# Patient Record
Sex: MALE | Race: WHITE | Employment: FULL TIME | URBAN - METROPOLITAN AREA
[De-identification: names, ages, dates, MRNs, and addresses within clinical notes are randomized per-mention and may not be internally consistent; named-entity substitution may affect disease eponyms.]

---

## 2018-09-16 ENCOUNTER — HOSPITAL ENCOUNTER (OUTPATIENT)
Age: 33
Discharge: HOME | End: 2018-09-16

## 2018-09-16 ENCOUNTER — ANESTHESIA EVENT (OUTPATIENT)
Dept: OPERATING ROOM | Age: 33
DRG: 343 | End: 2018-09-16

## 2018-09-16 ENCOUNTER — APPOINTMENT (OUTPATIENT)
Dept: CT IMAGING | Age: 33
DRG: 343 | End: 2018-09-16

## 2018-09-16 ENCOUNTER — ANESTHESIA (OUTPATIENT)
Dept: OPERATING ROOM | Age: 33
DRG: 343 | End: 2018-09-16

## 2018-09-16 ENCOUNTER — HOSPITAL ENCOUNTER (INPATIENT)
Age: 33
LOS: 1 days | Discharge: HOME | DRG: 343 | End: 2018-09-17
Attending: EMERGENCY MEDICINE | Admitting: SURGERY

## 2018-09-16 VITALS
TEMPERATURE: 99 F | SYSTOLIC BLOOD PRESSURE: 165 MMHG | DIASTOLIC BLOOD PRESSURE: 96 MMHG | OXYGEN SATURATION: 100 % | RESPIRATION RATE: 13 BRPM

## 2018-09-16 DIAGNOSIS — K35.30 ACUTE APPENDICITIS WITH LOCALIZED PERITONITIS: Primary | ICD-10-CM

## 2018-09-16 PROBLEM — K37 APPENDICITIS: Status: ACTIVE | Noted: 2018-09-16

## 2018-09-16 LAB
ALBUMIN SERPL-MCNC: 4.8 G/DL (ref 3.5–5.2)
ALP BLD-CCNC: 80 U/L (ref 40–129)
ALT SERPL-CCNC: 16 U/L (ref 0–40)
ANION GAP SERPL CALCULATED.3IONS-SCNC: 15 MMOL/L (ref 7–16)
AST SERPL-CCNC: 19 U/L (ref 0–39)
BACTERIA: NORMAL /HPF
BASOPHILS ABSOLUTE: 0.08 E9/L (ref 0–0.2)
BASOPHILS RELATIVE PERCENT: 0.3 % (ref 0–2)
BILIRUB SERPL-MCNC: 0.8 MG/DL (ref 0–1.2)
BILIRUBIN URINE: ABNORMAL
BLOOD, URINE: ABNORMAL
BUN BLDV-MCNC: 12 MG/DL (ref 6–20)
CALCIUM SERPL-MCNC: 10 MG/DL (ref 8.6–10.2)
CHLORIDE BLD-SCNC: 99 MMOL/L (ref 98–107)
CLARITY: CLEAR
CO2: 27 MMOL/L (ref 22–29)
COLOR: YELLOW
CREAT SERPL-MCNC: 0.9 MG/DL (ref 0.7–1.2)
EOSINOPHILS ABSOLUTE: 0.02 E9/L (ref 0.05–0.5)
EOSINOPHILS RELATIVE PERCENT: 0.1 % (ref 0–6)
GFR AFRICAN AMERICAN: >60
GFR NON-AFRICAN AMERICAN: >60 ML/MIN/1.73
GLUCOSE BLD-MCNC: 132 MG/DL (ref 74–109)
GLUCOSE URINE: NEGATIVE MG/DL
HCT VFR BLD CALC: 46.5 % (ref 37–54)
HEMOGLOBIN: 15.7 G/DL (ref 12.5–16.5)
IMMATURE GRANULOCYTES #: 0.27 E9/L
IMMATURE GRANULOCYTES %: 0.9 % (ref 0–5)
KETONES, URINE: NEGATIVE MG/DL
LACTIC ACID: 2.1 MMOL/L (ref 0.5–2.2)
LEUKOCYTE ESTERASE, URINE: NEGATIVE
LIPASE: 18 U/L (ref 13–60)
LYMPHOCYTES ABSOLUTE: 2.14 E9/L (ref 1.5–4)
LYMPHOCYTES RELATIVE PERCENT: 7.4 % (ref 20–42)
MCH RBC QN AUTO: 28.8 PG (ref 26–35)
MCHC RBC AUTO-ENTMCNC: 33.8 % (ref 32–34.5)
MCV RBC AUTO: 85.2 FL (ref 80–99.9)
MONOCYTES ABSOLUTE: 3.59 E9/L (ref 0.1–0.95)
MONOCYTES RELATIVE PERCENT: 12.5 % (ref 2–12)
NEUTROPHILS ABSOLUTE: 22.73 E9/L (ref 1.8–7.3)
NEUTROPHILS RELATIVE PERCENT: 78.8 % (ref 43–80)
NITRITE, URINE: NEGATIVE
PDW BLD-RTO: 13.6 FL (ref 11.5–15)
PH UA: 8 (ref 5–9)
PLATELET # BLD: 295 E9/L (ref 130–450)
PMV BLD AUTO: 9.2 FL (ref 7–12)
POTASSIUM SERPL-SCNC: 4.2 MMOL/L (ref 3.5–5)
PROTEIN UA: 30 MG/DL
RBC # BLD: 5.46 E12/L (ref 3.8–5.8)
RBC UA: NORMAL /HPF (ref 0–2)
SODIUM BLD-SCNC: 141 MMOL/L (ref 132–146)
SPECIFIC GRAVITY UA: 1.01 (ref 1–1.03)
TOTAL PROTEIN: 8.3 G/DL (ref 6.4–8.3)
UROBILINOGEN, URINE: 1 E.U./DL
WBC # BLD: 28.8 E9/L (ref 4.5–11.5)
WBC UA: NORMAL /HPF (ref 0–5)

## 2018-09-16 PROCEDURE — 3700000000 HC ANESTHESIA ATTENDED CARE: Performed by: SURGERY

## 2018-09-16 PROCEDURE — 99223 1ST HOSP IP/OBS HIGH 75: CPT | Performed by: SURGERY

## 2018-09-16 PROCEDURE — 83605 ASSAY OF LACTIC ACID: CPT

## 2018-09-16 PROCEDURE — 2500000003 HC RX 250 WO HCPCS: Performed by: NURSE ANESTHETIST, CERTIFIED REGISTERED

## 2018-09-16 PROCEDURE — 7100000001 HC PACU RECOVERY - ADDTL 15 MIN: Performed by: SURGERY

## 2018-09-16 PROCEDURE — 81001 URINALYSIS AUTO W/SCOPE: CPT

## 2018-09-16 PROCEDURE — 2500000003 HC RX 250 WO HCPCS: Performed by: SURGERY

## 2018-09-16 PROCEDURE — 83690 ASSAY OF LIPASE: CPT

## 2018-09-16 PROCEDURE — 96374 THER/PROPH/DIAG INJ IV PUSH: CPT

## 2018-09-16 PROCEDURE — 6360000004 HC RX CONTRAST MEDICATION: Performed by: RADIOLOGY

## 2018-09-16 PROCEDURE — 2580000003 HC RX 258: Performed by: EMERGENCY MEDICINE

## 2018-09-16 PROCEDURE — 99285 EMERGENCY DEPT VISIT HI MDM: CPT

## 2018-09-16 PROCEDURE — 88304 TISSUE EXAM BY PATHOLOGIST: CPT

## 2018-09-16 PROCEDURE — 6360000002 HC RX W HCPCS: Performed by: EMERGENCY MEDICINE

## 2018-09-16 PROCEDURE — 2500000003 HC RX 250 WO HCPCS: Performed by: EMERGENCY MEDICINE

## 2018-09-16 PROCEDURE — 74177 CT ABD & PELVIS W/CONTRAST: CPT

## 2018-09-16 PROCEDURE — 3700000001 HC ADD 15 MINUTES (ANESTHESIA): Performed by: SURGERY

## 2018-09-16 PROCEDURE — 6360000002 HC RX W HCPCS: Performed by: NURSE ANESTHETIST, CERTIFIED REGISTERED

## 2018-09-16 PROCEDURE — A0428 BLS: HCPCS

## 2018-09-16 PROCEDURE — 96375 TX/PRO/DX INJ NEW DRUG ADDON: CPT

## 2018-09-16 PROCEDURE — 44970 LAPAROSCOPY APPENDECTOMY: CPT | Performed by: SURGERY

## 2018-09-16 PROCEDURE — 85025 COMPLETE CBC W/AUTO DIFF WBC: CPT

## 2018-09-16 PROCEDURE — 80053 COMPREHEN METABOLIC PANEL: CPT

## 2018-09-16 PROCEDURE — 1200000000 HC SEMI PRIVATE

## 2018-09-16 PROCEDURE — 36415 COLL VENOUS BLD VENIPUNCTURE: CPT

## 2018-09-16 PROCEDURE — 2709999900 HC NON-CHARGEABLE SUPPLY: Performed by: SURGERY

## 2018-09-16 PROCEDURE — 7100000000 HC PACU RECOVERY - FIRST 15 MIN: Performed by: SURGERY

## 2018-09-16 PROCEDURE — 0DTJ4ZZ RESECTION OF APPENDIX, PERCUTANEOUS ENDOSCOPIC APPROACH: ICD-10-PCS | Performed by: SURGERY

## 2018-09-16 PROCEDURE — A0425 GROUND MILEAGE: HCPCS

## 2018-09-16 PROCEDURE — 3600000012 HC SURGERY LEVEL 2 ADDTL 15MIN: Performed by: SURGERY

## 2018-09-16 PROCEDURE — 2720000010 HC SURG SUPPLY STERILE: Performed by: SURGERY

## 2018-09-16 PROCEDURE — 3600000002 HC SURGERY LEVEL 2 BASE: Performed by: SURGERY

## 2018-09-16 RX ORDER — MIDAZOLAM HYDROCHLORIDE 1 MG/ML
INJECTION INTRAMUSCULAR; INTRAVENOUS PRN
Status: DISCONTINUED | OUTPATIENT
Start: 2018-09-16 | End: 2018-09-16 | Stop reason: SDUPTHER

## 2018-09-16 RX ORDER — SODIUM CHLORIDE 0.9 % (FLUSH) 0.9 %
10 SYRINGE (ML) INJECTION PRN
Status: DISCONTINUED | OUTPATIENT
Start: 2018-09-16 | End: 2018-09-17 | Stop reason: HOSPADM

## 2018-09-16 RX ORDER — ACETAMINOPHEN 325 MG/1
650 TABLET ORAL EVERY 4 HOURS PRN
Status: DISCONTINUED | OUTPATIENT
Start: 2018-09-16 | End: 2018-09-17 | Stop reason: HOSPADM

## 2018-09-16 RX ORDER — OXYCODONE HYDROCHLORIDE AND ACETAMINOPHEN 5; 325 MG/1; MG/1
1 TABLET ORAL EVERY 4 HOURS PRN
Status: DISCONTINUED | OUTPATIENT
Start: 2018-09-16 | End: 2018-09-17 | Stop reason: HOSPADM

## 2018-09-16 RX ORDER — ONDANSETRON 2 MG/ML
INJECTION INTRAMUSCULAR; INTRAVENOUS PRN
Status: DISCONTINUED | OUTPATIENT
Start: 2018-09-16 | End: 2018-09-16 | Stop reason: SDUPTHER

## 2018-09-16 RX ORDER — ROCURONIUM BROMIDE 10 MG/ML
INJECTION, SOLUTION INTRAVENOUS PRN
Status: DISCONTINUED | OUTPATIENT
Start: 2018-09-16 | End: 2018-09-16 | Stop reason: SDUPTHER

## 2018-09-16 RX ORDER — KETOROLAC TROMETHAMINE 30 MG/ML
30 INJECTION, SOLUTION INTRAMUSCULAR; INTRAVENOUS ONCE
Status: COMPLETED | OUTPATIENT
Start: 2018-09-16 | End: 2018-09-16

## 2018-09-16 RX ORDER — 0.9 % SODIUM CHLORIDE 0.9 %
1000 INTRAVENOUS SOLUTION INTRAVENOUS ONCE
Status: COMPLETED | OUTPATIENT
Start: 2018-09-16 | End: 2018-09-16

## 2018-09-16 RX ORDER — LIDOCAINE HYDROCHLORIDE 20 MG/ML
INJECTION, SOLUTION INFILTRATION; PERINEURAL PRN
Status: DISCONTINUED | OUTPATIENT
Start: 2018-09-16 | End: 2018-09-16 | Stop reason: SDUPTHER

## 2018-09-16 RX ORDER — SODIUM CHLORIDE, SODIUM LACTATE, POTASSIUM CHLORIDE, CALCIUM CHLORIDE 600; 310; 30; 20 MG/100ML; MG/100ML; MG/100ML; MG/100ML
INJECTION, SOLUTION INTRAVENOUS CONTINUOUS
Status: DISCONTINUED | OUTPATIENT
Start: 2018-09-16 | End: 2018-09-17 | Stop reason: HOSPADM

## 2018-09-16 RX ORDER — ONDANSETRON 2 MG/ML
8 INJECTION INTRAMUSCULAR; INTRAVENOUS ONCE
Status: COMPLETED | OUTPATIENT
Start: 2018-09-16 | End: 2018-09-16

## 2018-09-16 RX ORDER — NEOSTIGMINE METHYLSULFATE 1 MG/ML
INJECTION, SOLUTION INTRAVENOUS PRN
Status: DISCONTINUED | OUTPATIENT
Start: 2018-09-16 | End: 2018-09-16 | Stop reason: SDUPTHER

## 2018-09-16 RX ORDER — BUPIVACAINE HYDROCHLORIDE AND EPINEPHRINE 2.5; 5 MG/ML; UG/ML
INJECTION, SOLUTION EPIDURAL; INFILTRATION; INTRACAUDAL; PERINEURAL PRN
Status: DISCONTINUED | OUTPATIENT
Start: 2018-09-16 | End: 2018-09-17 | Stop reason: HOSPADM

## 2018-09-16 RX ORDER — SUCCINYLCHOLINE/SOD CL,ISO/PF 100 MG/5ML
SYRINGE (ML) INTRAVENOUS PRN
Status: DISCONTINUED | OUTPATIENT
Start: 2018-09-16 | End: 2018-09-16 | Stop reason: SDUPTHER

## 2018-09-16 RX ORDER — FENTANYL CITRATE 50 UG/ML
INJECTION, SOLUTION INTRAMUSCULAR; INTRAVENOUS PRN
Status: DISCONTINUED | OUTPATIENT
Start: 2018-09-16 | End: 2018-09-16 | Stop reason: SDUPTHER

## 2018-09-16 RX ORDER — GLYCOPYRROLATE 1 MG/5 ML
SYRINGE (ML) INTRAVENOUS PRN
Status: DISCONTINUED | OUTPATIENT
Start: 2018-09-16 | End: 2018-09-16 | Stop reason: SDUPTHER

## 2018-09-16 RX ORDER — OXYCODONE HYDROCHLORIDE AND ACETAMINOPHEN 5; 325 MG/1; MG/1
2 TABLET ORAL EVERY 4 HOURS PRN
Status: DISCONTINUED | OUTPATIENT
Start: 2018-09-16 | End: 2018-09-17 | Stop reason: HOSPADM

## 2018-09-16 RX ORDER — PROPOFOL 10 MG/ML
INJECTION, EMULSION INTRAVENOUS PRN
Status: DISCONTINUED | OUTPATIENT
Start: 2018-09-16 | End: 2018-09-16 | Stop reason: SDUPTHER

## 2018-09-16 RX ORDER — OXYCODONE HYDROCHLORIDE AND ACETAMINOPHEN 5; 325 MG/1; MG/1
1 TABLET ORAL EVERY 4 HOURS PRN
Qty: 15 TABLET | Refills: 0 | Status: SHIPPED | OUTPATIENT
Start: 2018-09-16 | End: 2018-09-19

## 2018-09-16 RX ORDER — SODIUM CHLORIDE 9 MG/ML
INJECTION, SOLUTION INTRAVENOUS CONTINUOUS
Status: DISCONTINUED | OUTPATIENT
Start: 2018-09-16 | End: 2018-09-17 | Stop reason: HOSPADM

## 2018-09-16 RX ORDER — DEXAMETHASONE SODIUM PHOSPHATE 10 MG/ML
INJECTION, SOLUTION INTRAMUSCULAR; INTRAVENOUS PRN
Status: DISCONTINUED | OUTPATIENT
Start: 2018-09-16 | End: 2018-09-16 | Stop reason: SDUPTHER

## 2018-09-16 RX ORDER — SODIUM CHLORIDE 0.9 % (FLUSH) 0.9 %
10 SYRINGE (ML) INJECTION EVERY 12 HOURS SCHEDULED
Status: DISCONTINUED | OUTPATIENT
Start: 2018-09-16 | End: 2018-09-17 | Stop reason: HOSPADM

## 2018-09-16 RX ORDER — MORPHINE SULFATE 10 MG/ML
6 INJECTION, SOLUTION INTRAMUSCULAR; INTRAVENOUS ONCE
Status: COMPLETED | OUTPATIENT
Start: 2018-09-16 | End: 2018-09-16

## 2018-09-16 RX ADMIN — ONDANSETRON HYDROCHLORIDE 4 MG: 2 INJECTION, SOLUTION INTRAMUSCULAR; INTRAVENOUS at 22:35

## 2018-09-16 RX ADMIN — KETOROLAC TROMETHAMINE 30 MG: 30 INJECTION, SOLUTION INTRAMUSCULAR at 16:33

## 2018-09-16 RX ADMIN — Medication 0.6 MG: at 22:56

## 2018-09-16 RX ADMIN — ONDANSETRON 8 MG: 2 INJECTION INTRAMUSCULAR; INTRAVENOUS at 16:31

## 2018-09-16 RX ADMIN — METRONIDAZOLE 500 MG: 500 INJECTION, SOLUTION INTRAVENOUS at 18:31

## 2018-09-16 RX ADMIN — SODIUM CHLORIDE: 9 INJECTION, SOLUTION INTRAVENOUS at 22:44

## 2018-09-16 RX ADMIN — ROCURONIUM BROMIDE 10 MG: 10 INJECTION INTRAVENOUS at 22:08

## 2018-09-16 RX ADMIN — CEFAZOLIN SODIUM 2 G: 2 SOLUTION INTRAVENOUS at 17:58

## 2018-09-16 RX ADMIN — MORPHINE SULFATE 6 MG: 10 INJECTION INTRAVENOUS at 17:50

## 2018-09-16 RX ADMIN — ROCURONIUM BROMIDE 20 MG: 10 INJECTION INTRAVENOUS at 22:25

## 2018-09-16 RX ADMIN — PROPOFOL 200 MG: 10 INJECTION, EMULSION INTRAVENOUS at 22:08

## 2018-09-16 RX ADMIN — SODIUM CHLORIDE: 9 INJECTION, SOLUTION INTRAVENOUS at 17:54

## 2018-09-16 RX ADMIN — Medication 160 MG: at 22:08

## 2018-09-16 RX ADMIN — LIDOCAINE HYDROCHLORIDE 100 MG: 20 INJECTION, SOLUTION INFILTRATION; PERINEURAL at 22:08

## 2018-09-16 RX ADMIN — MIDAZOLAM HYDROCHLORIDE 2 MG: 1 INJECTION, SOLUTION INTRAMUSCULAR; INTRAVENOUS at 22:08

## 2018-09-16 RX ADMIN — FENTANYL CITRATE 50 MCG: 50 INJECTION, SOLUTION INTRAMUSCULAR; INTRAVENOUS at 22:46

## 2018-09-16 RX ADMIN — Medication 3 MG: at 22:56

## 2018-09-16 RX ADMIN — SODIUM CHLORIDE 1000 ML: 9 INJECTION, SOLUTION INTRAVENOUS at 16:30

## 2018-09-16 RX ADMIN — FENTANYL CITRATE 50 MCG: 50 INJECTION, SOLUTION INTRAMUSCULAR; INTRAVENOUS at 22:25

## 2018-09-16 RX ADMIN — DEXAMETHASONE SODIUM PHOSPHATE 10 MG: 10 INJECTION, SOLUTION INTRAMUSCULAR; INTRAVENOUS at 22:17

## 2018-09-16 RX ADMIN — WATER 2 G: 1 INJECTION INTRAMUSCULAR; INTRAVENOUS; SUBCUTANEOUS at 22:32

## 2018-09-16 RX ADMIN — ROCURONIUM BROMIDE 20 MG: 10 INJECTION INTRAVENOUS at 22:17

## 2018-09-16 RX ADMIN — IOPAMIDOL 100 ML: 755 INJECTION, SOLUTION INTRAVENOUS at 17:29

## 2018-09-16 ASSESSMENT — PAIN DESCRIPTION - DESCRIPTORS
DESCRIPTORS: CONSTANT
DESCRIPTORS: CONSTANT;SHARP

## 2018-09-16 ASSESSMENT — ENCOUNTER SYMPTOMS
BLOOD IN STOOL: 0
DIARRHEA: 0
CHEST TIGHTNESS: 0
CONSTIPATION: 0
ABDOMINAL PAIN: 1
SHORTNESS OF BREATH: 0
WHEEZING: 0
TROUBLE SWALLOWING: 0
NAUSEA: 1
VOMITING: 1
COUGH: 0
SORE THROAT: 0
RHINORRHEA: 0

## 2018-09-16 ASSESSMENT — PULMONARY FUNCTION TESTS
PIF_VALUE: 3
PIF_VALUE: 23
PIF_VALUE: 0
PIF_VALUE: 28
PIF_VALUE: 27
PIF_VALUE: 9
PIF_VALUE: 30
PIF_VALUE: 0
PIF_VALUE: 1
PIF_VALUE: 29
PIF_VALUE: 29
PIF_VALUE: 2
PIF_VALUE: 1
PIF_VALUE: 20
PIF_VALUE: 8
PIF_VALUE: 30
PIF_VALUE: 22
PIF_VALUE: 29
PIF_VALUE: 4
PIF_VALUE: 21
PIF_VALUE: 24
PIF_VALUE: 28
PIF_VALUE: 29
PIF_VALUE: 20
PIF_VALUE: 16
PIF_VALUE: 21
PIF_VALUE: 29
PIF_VALUE: 3
PIF_VALUE: 29
PIF_VALUE: 20
PIF_VALUE: 22
PIF_VALUE: 28
PIF_VALUE: 29
PIF_VALUE: 21
PIF_VALUE: 1
PIF_VALUE: 20
PIF_VALUE: 24
PIF_VALUE: 19
PIF_VALUE: 20
PIF_VALUE: 28
PIF_VALUE: 20
PIF_VALUE: 4
PIF_VALUE: 28
PIF_VALUE: 20
PIF_VALUE: 29
PIF_VALUE: 3
PIF_VALUE: 31
PIF_VALUE: 2
PIF_VALUE: 30
PIF_VALUE: 21
PIF_VALUE: 30
PIF_VALUE: 28
PIF_VALUE: 20
PIF_VALUE: 27
PIF_VALUE: 20
PIF_VALUE: 20
PIF_VALUE: 30
PIF_VALUE: 20
PIF_VALUE: 30
PIF_VALUE: 4
PIF_VALUE: 3
PIF_VALUE: 29

## 2018-09-16 ASSESSMENT — PAIN DESCRIPTION - ORIENTATION
ORIENTATION: RIGHT;LOWER
ORIENTATION: RIGHT
ORIENTATION: RIGHT;LOWER

## 2018-09-16 ASSESSMENT — PAIN SCALES - GENERAL
PAINLEVEL_OUTOF10: 7
PAINLEVEL_OUTOF10: 7
PAINLEVEL_OUTOF10: 0
PAINLEVEL_OUTOF10: 10
PAINLEVEL_OUTOF10: 0
PAINLEVEL_OUTOF10: 7
PAINLEVEL_OUTOF10: 10

## 2018-09-16 ASSESSMENT — PAIN DESCRIPTION - PAIN TYPE
TYPE: ACUTE PAIN

## 2018-09-16 ASSESSMENT — PAIN DESCRIPTION - LOCATION
LOCATION: ABDOMEN

## 2018-09-16 ASSESSMENT — LIFESTYLE VARIABLES: SMOKING_STATUS: 1

## 2018-09-16 NOTE — ED NOTES
Flagyl not finished running but stopped for EMS transfer and notified nursing in [place] [id number] of need to finish flagyl on arrival.     Maki Frey RN  09/16/18 3064

## 2018-09-16 NOTE — ED PROVIDER NOTES
membranes are normal. Mucous membranes are not pale and not dry. Eyes: Pupils are equal, round, and reactive to light. Conjunctivae and EOM are normal.   Neck: Normal range of motion. Neck supple. Cardiovascular: Normal rate, regular rhythm, S1 normal, S2 normal, normal heart sounds and intact distal pulses. No murmur heard. Pulses:       Radial pulses are 2+ on the right side, and 2+ on the left side. Dorsalis pedis pulses are 2+ on the right side, and 2+ on the left side. Pulmonary/Chest: Effort normal and breath sounds normal. No respiratory distress. He has no decreased breath sounds. He has no wheezes. He has no rhonchi. Abdominal: Soft. Bowel sounds are normal. He exhibits no distension. There is tenderness in the right lower quadrant. There is rebound, guarding (voluntary) and tenderness at McBurney's point. Musculoskeletal: Normal range of motion. He exhibits no edema. Neurological: He is alert and oriented to person, place, and time. Skin: Skin is warm, dry and intact. No rash noted. No erythema. Psychiatric: He has a normal mood and affect. His speech is normal and behavior is normal.   Nursing note and vitals reviewed. Procedures    [facility]         --------------------------------------------- PAST HISTORY ---------------------------------------------  Past Medical History:  has a past medical history of Heart enlarged. Past Surgical History:  has no past surgical history on file. Social History:  reports that he has been smoking Cigarettes. He has never used smokeless tobacco.    Family History: family history is not on file. The patients home medications have been reviewed. Allergies: Patient has no known allergies.     -------------------------------------------------- RESULTS -------------------------------------------------    Lab  Results for orders placed or performed during the hospital encounter of 09/16/18   CBC WITH AUTO DIFFERENTIAL   Result Value Ref Range    WBC 28.8 (H) 4.5 - 11.5 E9/L    RBC 5.46 3.80 - 5.80 E12/L    Hemoglobin 15.7 12.5 - 16.5 g/dL    Hematocrit 46.5 37.0 - 54.0 %    MCV 85.2 80.0 - 99.9 fL    MCH 28.8 26.0 - 35.0 pg    MCHC 33.8 32.0 - 34.5 %    RDW 13.6 11.5 - 15.0 fL    Platelets 592 198 - 044 E9/L    MPV 9.2 7.0 - 12.0 fL    Neutrophils % 78.8 43.0 - 80.0 %    Immature Granulocytes % 0.9 0.0 - 5.0 %    Lymphocytes % 7.4 (L) 20.0 - 42.0 %    Monocytes % 12.5 (H) 2.0 - 12.0 %    Eosinophils % 0.1 0.0 - 6.0 %    Basophils % 0.3 0.0 - 2.0 %    Neutrophils # 22.73 (H) 1.80 - 7.30 E9/L    Immature Granulocytes # 0.27 E9/L    Lymphocytes # 2.14 1.50 - 4.00 E9/L    Monocytes # 3.59 (H) 0.10 - 0.95 E9/L    Eosinophils # 0.02 (L) 0.05 - 0.50 E9/L    Basophils # 0.08 0.00 - 0.20 E9/L   Comprehensive Metabolic Panel   Result Value Ref Range    Sodium 141 132 - 146 mmol/L    Potassium 4.2 3.5 - 5.0 mmol/L    Chloride 99 98 - 107 mmol/L    CO2 27 22 - 29 mmol/L    Anion Gap 15 7 - 16 mmol/L    Glucose 132 (H) 74 - 109 mg/dL    BUN 12 6 - 20 mg/dL    CREATININE 0.9 0.7 - 1.2 mg/dL    GFR Non-African American >60 >=60 mL/min/1.73    GFR African American >60     Calcium 10.0 8.6 - 10.2 mg/dL    Total Protein 8.3 6.4 - 8.3 g/dL    Alb 4.8 3.5 - 5.2 g/dL    Total Bilirubin 0.8 0.0 - 1.2 mg/dL    Alkaline Phosphatase 80 40 - 129 U/L    ALT 16 0 - 40 U/L    AST 19 0 - 39 U/L   Lipase   Result Value Ref Range    Lipase 18 13 - 60 U/L   Lactic Acid, Plasma   Result Value Ref Range    Lactic Acid 2.1 0.5 - 2.2 mmol/L   Urinalysis   Result Value Ref Range    Color, UA Yellow Straw/Yellow    Clarity, UA Clear Clear    Glucose, Ur Negative Negative mg/dL    Bilirubin Urine SMALL (A) Negative    Ketones, Urine Negative Negative mg/dL    Specific Gravity, UA 1.015 1.005 - 1.030    Blood, Urine SMALL (A) Negative    pH, UA 8.0 5.0 - 9.0    Protein, UA 30 (A) Negative mg/dL    Urobilinogen, Urine 1.0 <2.0 E.U./dL    Nitrite, Urine Negative Negative    Leukocyte Esterase, Urine Negative Negative   Microscopic Urinalysis   Result Value Ref Range    WBC, UA NONE 0 - 5 /HPF    RBC, UA 1-3 0 - 2 /HPF    Bacteria, UA NONE /HPF       Radiology  CT ABDOMEN PELVIS W IV CONTRAST Additional Contrast? None   Final Result   There are multiple diverticula seen    Findings suspicious for appendicitis   Findings compatible with mild ascites      ALERT:  THIS IS AN ABNORMAL REPORT                             ------------------------- NURSING NOTES AND VITALS REVIEWED ---------------------------  Date / Time Roomed:  9/16/2018  3:57 PM  ED Bed Assignment:  04/04    The nursing notes within the ED encounter and vital signs as below have been reviewed. Patient Vitals for the past 24 hrs:   BP Temp Temp src Pulse Resp SpO2 Height Weight   09/16/18 1722 - 98.7 °F (37.1 °C) - 96 18 - - -   09/16/18 1605 (!) 143/94 - - - - - - -   09/16/18 1602 - 98.7 °F (37.1 °C) Oral 99 18 96 % 5' 4\" (1.626 m) 140 lb (63.5 kg)       Oxygen Saturation Interpretation: Normal      ------------------------------------------ PROGRESS NOTES ------------------------------------------  Re-evaluation(s):  Time: 5:50 PM  Finding of acute appendicitis, pain is under better control with IV pain meds that were added, continue IV hydration and nothing by mouth. We have a call out to general surgery at this time, we'll start IV antibiotics as well pending transfer to [address] [place] [address]. Patient without further questions. I have spoken with the patient and discussed todays results, in addition to providing specific details for the plan of care and counseling regarding the diagnosis and prognosis. Their questions are answered at this time and they are agreeable with the plan. I have discussed the risks and benefits of transfer and they wish to proceed with the transfer.       --------------------------------- ADDITIONAL PROVIDER NOTES ---------------------------------  Consultations:  6:00 PM. Spoke with

## 2018-09-16 NOTE — ED NOTES
Received alert and oriented x3 via ems. In no distress.  Transfer from [facility]  09/16/18 [facility]

## 2018-09-16 NOTE — ED PROVIDER NOTES
HPI:  9/16/18, Time: 7:06 PM         Waqar Green is a 33 y.o. male presenting to the ED for abdominal pain, beginning 1 day ago. The complaint has been persistent, moderate in severity, and worsened by nothing. Patient reports abdominal  pain started yesterday  Patient was sent here from outlying facility. He does report vomiting and fever. There is no hx of gi bleed  ROS:   Pertinent positives and negatives are stated within HPI, all other systems reviewed and are negative.  --------------------------------------------- PAST HISTORY ---------------------------------------------  Past Medical History:  has a past medical history of Heart enlarged. Past Surgical History:  has no past surgical history on file. Social History:  reports that he has been smoking Cigarettes. He has never used smokeless tobacco.    Family History: family history is not on file. The patients home medications have been reviewed. Allergies: Patient has no known allergies. ---------------------------------------------------PHYSICAL EXAM--------------------------------------    Constitutional/General: Alert and oriented x3, mild distress  Head: Normocephalic and atraumatic  Eyes: PERRL, EOMI  Mouth: Oropharynx clear, handling secretions, no trismus  Neck: Supple, full ROM, non tender to palpation in the midline, no stridor, no crepitus, no meningeal signs  Pulmonary: Lungs clear to auscultation bilaterally, no wheezes, rales, or rhonchi. Not in respiratory distress  Cardiovascular:  Regular rate. Regular rhythm. No murmurs, gallops, or rubs. 2+ distal pulses  Chest: no chest wall tenderness   Abdomen: Soft. Tender right lower. Noted gaurding  Musculoskeletal: Moves all extremities x 4. Warm and well perfused, no clubbing, cyanosis, or edema. Capillary refill <3 seconds  Skin: warm and dry. No rashes.    Neurologic: GCS 15, CN 2-12 grossly intact, no focal deficits, symmetric strength 5/5 in the upper and lower iopamidol (ISOVUE-370) 76 % injection 100 mL (100 mLs Intravenous Given 9/16/18 0705)   morphine (PF) injection 6 mg (6 mg Intravenous Given 9/16/18 1750)             Medical Decision Making:        Re-Evaluations:             Re-evaluation. Patients symptoms show no change      Consultations:             General surgery    Critical Care: This patient's ED course included: a personal history and physicial eaxmination    This patient has been closely monitored during their ED course. Counseling: The emergency provider has spoken with the patient and discussed todays results, in addition to providing specific details for the plan of care and counseling regarding the diagnosis and prognosis. Questions are answered at this time and they are agreeable with the plan.       --------------------------------- IMPRESSION AND DISPOSITION ---------------------------------    IMPRESSION  1. Acute appendicitis with localized peritonitis        DISPOSITION  Disposition: Admit to surgery  Patient condition is stable        NOTE: This report was transcribed using voice recognition software.  Every effort was made to ensure accuracy; however, inadvertent computerized transcription errors may be present          Yandy Willson MD  09/16/18 [id number]

## 2018-09-16 NOTE — LETTER
[address] [place] [id number]  Phone: [phone]         September 17, 2018     Patient: Kaila Sanchez   YOB: 1985   Date of Visit: 9/16/2018       To Whom It May Concern:    Arpita Lee was admitted to [facility] on 9/16/2018 and discharged on 9/17/2018.         Sincerely,        TELMA Meade

## 2018-09-16 NOTE — ED NOTES
Pt NPO since arrival at [phone]. He last ate yesterday around 1700 and has vomited water all day today.      Foster Lujan RN  09/16/18 0760

## 2018-09-17 VITALS
HEART RATE: 66 BPM | BODY MASS INDEX: 23.9 KG/M2 | HEIGHT: 64 IN | OXYGEN SATURATION: 95 % | WEIGHT: 140 LBS | RESPIRATION RATE: 16 BRPM | SYSTOLIC BLOOD PRESSURE: 111 MMHG | TEMPERATURE: 98.3 F | DIASTOLIC BLOOD PRESSURE: 55 MMHG

## 2018-09-17 PROCEDURE — 2580000003 HC RX 258: Performed by: STUDENT IN AN ORGANIZED HEALTH CARE EDUCATION/TRAINING PROGRAM

## 2018-09-17 PROCEDURE — 6370000000 HC RX 637 (ALT 250 FOR IP): Performed by: STUDENT IN AN ORGANIZED HEALTH CARE EDUCATION/TRAINING PROGRAM

## 2018-09-17 RX ADMIN — OXYCODONE HYDROCHLORIDE AND ACETAMINOPHEN 2 TABLET: 5; 325 TABLET ORAL at 01:33

## 2018-09-17 RX ADMIN — OXYCODONE HYDROCHLORIDE AND ACETAMINOPHEN 2 TABLET: 5; 325 TABLET ORAL at 08:19

## 2018-09-17 RX ADMIN — SODIUM CHLORIDE, POTASSIUM CHLORIDE, SODIUM LACTATE AND CALCIUM CHLORIDE: 600; 310; 30; 20 INJECTION, SOLUTION INTRAVENOUS at 06:33

## 2018-09-17 ASSESSMENT — PAIN DESCRIPTION - ORIENTATION
ORIENTATION: LEFT;LOWER
ORIENTATION: LEFT

## 2018-09-17 ASSESSMENT — PAIN DESCRIPTION - LOCATION
LOCATION: ABDOMEN
LOCATION: ABDOMEN;INCISION

## 2018-09-17 ASSESSMENT — PAIN DESCRIPTION - DESCRIPTORS
DESCRIPTORS: ACHING;DISCOMFORT;TENDER
DESCRIPTORS: DISCOMFORT;SORE;TENDER

## 2018-09-17 ASSESSMENT — PAIN DESCRIPTION - PAIN TYPE
TYPE: SURGICAL PAIN
TYPE: SURGICAL PAIN

## 2018-09-17 ASSESSMENT — PAIN SCALES - GENERAL
PAINLEVEL_OUTOF10: 9
PAINLEVEL_OUTOF10: 7
PAINLEVEL_OUTOF10: 7
PAINLEVEL_OUTOF10: 3
PAINLEVEL_OUTOF10: 0

## 2018-09-17 ASSESSMENT — PAIN DESCRIPTION - FREQUENCY
FREQUENCY: CONTINUOUS
FREQUENCY: INTERMITTENT

## 2018-09-17 ASSESSMENT — PAIN DESCRIPTION - ONSET: ONSET: ON-GOING

## 2018-09-17 ASSESSMENT — PAIN DESCRIPTION - PROGRESSION: CLINICAL_PROGRESSION: NOT CHANGED

## 2018-09-17 NOTE — H&P
GENERAL SURGERY  CONSULT NOTE  9/16/2018    Physician Consulted: Dr. Nadya Smith  Reason for Consult: acute appendicitis      HPI  Cameron Bosworth is a 33 y.o. male who presents for evaluation of RLE abd pain and N/V for 2 days. Denies F/C, CP, SOB. He denies flatus/BM. Notes a Hx of cardiomegaly but takes no medications and does not follow with a cardiologist.       Past Medical History:   Diagnosis Date    Heart enlarged        History reviewed. No pertinent surgical history. Medications Prior to Admission:    Prior to Admission medications    Not on File       No Known Allergies    History reviewed. No pertinent family history. Social History   Substance Use Topics    Smoking status: Current Every Day Smoker     Types: Cigarettes    Smokeless tobacco: Never Used    Alcohol use Not on file         Review of Systems   General ROS: fever (-), chills (-)  Respiratory ROS: SOB (-)  Cardiovascular ROS: CP (-)  Gastrointestinal ROS: Nausea (+), vomiting (+), diarrhea (-), constipation (-), melena (-), hematochezia (-)        PHYSICAL EXAM:    Vitals:    09/16/18 1906   BP: 122/84   Pulse: 97   Resp: 20   Temp: 99.5 °F (37.5 °C)   SpO2: 94%       General Appearance:  Alert, awake, cooperative, lying in bed   Lungs:  Normal work of breathing   Heart:  Regular rate   Abdomen:  Soft, RLE tenderness, non distended       LABS:  CBC  Recent Labs      09/16/18   1642   WBC  28.8*   HGB  15.7   HCT  46.5   PLT  295     BMP  Recent Labs      09/16/18   1642   NA  141   K  4.2   CL  99   CO2  27   BUN  12   CREATININE  0.9   CALCIUM  10.0     Liver Function  Recent Labs      09/16/18   1642   LIPASE  18   BILITOT  0.8   AST  19   ALT  16   ALKPHOS  80   PROT  8.3   LABALBU  4.8     No results for input(s): LACTATE in the last 72 hours. No results for input(s): INR, PTT in the last 72 hours.     Invalid input(s): PT    RADIOLOGY  Ct Abdomen Pelvis W Iv Contrast Additional Contrast? None    Result Date: 9/16/2018  Patient MRN:  [id number] : 1985 Age: 33 years Gender: Male Order Date:  2018 4:30 PM EXAM: CT ABDOMEN PELVIS W IV CONTRAST NUMBER OF IMAGES \ views:  327 INDICATION:  ABDOMINAL PAIN COMPARISON: None Technique: Low-dose CT  acquisition technique included one of following options; 1 . Automated exposure control, 2. Adjustment of MA and or KV according to patient's size or 3. Use of iterative reconstruction. Multiple computerized tomography sections of the abdomen with sagittal and coronal MPR reconstructions were obtained from the top of the diaphragm to the pelvis. The visualized portions of the abdomen reveal: The lung bases are abnormal. There are bilateral infiltrates at the lung bases, likely related to atelectasis  . Lovetta Blaze The liver is unremarkable The spleen is unremarkable. The kidneys are unremarkable The adrenals is  unremarkable. The pancreas is unremarkable. The appendix is abnormal. There are inflammatory changes seen adjacent to the appendix. The appendix appears to be enlarged. The findings suggest appendicitis The bowel is  abnormal. There are multiple diverticula seen The bladder is unremarkable.  There is a mild amount of ascites     There are multiple diverticula seen Findings suspicious for appendicitis Findings compatible with mild ascites ALERT:  THIS IS AN ABNORMAL REPORT         ASSESSMENT:  33 y.o. male with acute appendicitis    PLAN:  NPO  IVF  Abx  OR for lap appy tonight    Electronically signed by Ava Lee MD on 18 at 8:12 PM

## 2018-09-17 NOTE — ANESTHESIA POSTPROCEDURE EVALUATION
Department of Anesthesiology  Postprocedure Note    Patient: Nilay Clark  MRN: [id number]  YOB: 1985  Date of evaluation: 9/17/2018  Time:  6:40 AM     Procedure Summary     Date:  09/16/18 Room / Location:  [facility] / [facility]    Anesthesia Start:  2202 Anesthesia Stop:      Procedure:  APPENDECTOMY LAPAROSCOPIC (N/A Abdomen) Diagnosis:  (Appendicitis)    Surgeon:  Jovanni Salazar MD Responsible Provider:  [place] Jaramillo MD    Anesthesia Type:  general ASA Status:  2 - Emergent          Anesthesia Type: general    Cherie Phase I: Cherie Score: 10    Cherie Phase II:      Last vitals: Reviewed and per EMR flowsheets.        Anesthesia Post Evaluation    Patient location during evaluation: PACU  Patient participation: complete - patient participated  Level of consciousness: awake  Pain score: 3  Airway patency: patent  Nausea & Vomiting: no nausea and no vomiting  Complications: no  Cardiovascular status: blood pressure returned to baseline  Respiratory status: acceptable  Hydration status: euvolemic

## 2018-09-17 NOTE — PROGRESS NOTES
GENERAL SURGERY  DAILY PROGRESS NOTE  9/17/2018    Subjective:  No nausea, vomiting, fever, or chills, feels much better after surgery, has tolerated clears, has not yet ambulated    Objective:  /70   Pulse 74   Temp 99.2 °F (37.3 °C) (Temporal)   Resp 16   Ht 5' 4\" (1.626 m)   Wt 140 lb (63.5 kg)   SpO2 95%   BMI 24.03 kg/m²     GENERAL:  Laying in bed, awake, alert, cooperative, no apparent distress  LUNGS:  No increased work of breathing, no cyanosis  CARDIOVASCULAR:  Extremities warm and well perfused,   ABDOMEN:  Soft, minimal luis enrique-incisional tenderness, non distended, no guarding or rebound  EXTREMITIES: No edema or swelling  SKIN: Warm and dry    Assessment/Plan:  33 y.o. male sp laparoscopic appendectomy 9/16    -advance diet as tolerated  -pain control  -encourage bowel regimen  -reinforce activity limitations  -discharge home today    Electronically signed by Leonela Medeiros MD on 9/17/2018 at 7:21 AM

## 2018-09-17 NOTE — ANESTHESIA PRE PROCEDURE
Department of Anesthesiology  Preprocedure Note       Name:  Nilsa Dior   Age:  33 y.o.  :  1985                                          MRN:  [id number]         Date:  2018      Surgeon: Huseyin Cohen):  Rodrigo Mead MD    Procedure: Procedure(s):  APPENDECTOMY LAPAROSCOPIC    Medications prior to admission:   Prior to Admission medications    Not on File       Current medications:    Current Facility-Administered Medications   Medication Dose Route Frequency Provider Last Rate Last Dose    0.9 % sodium chloride infusion   Intravenous Continuous [place],  mL/hr at 18 1754      cefTRIAXone (ROCEPHIN) 2 g in sterile water 20 mL IV syringe  2 g Intravenous Once [place], DO         No current outpatient prescriptions on file. Allergies:  No Known Allergies    Problem List:  There is no problem list on file for this patient. Past Medical History:        Diagnosis Date    Heart enlarged        Past Surgical History:  History reviewed. No pertinent surgical history.     Social History:    Social History   Substance Use Topics    Smoking status: Current Every Day Smoker     Types: Cigarettes    Smokeless tobacco: Never Used    Alcohol use Not on file                                Ready to quit: Not Answered  Counseling given: Not Answered      Vital Signs (Current):   Vitals:    18 1605 18 1722 18 1808 18 1906   BP: (!) 143/94  126/74 122/84   Pulse:  96 95 97   Resp:  18 16 20   Temp:  37.1 °C (98.7 °F) 37.8 °C (100.1 °F) 37.5 °C (99.5 °F)   TempSrc:   Oral Temporal   SpO2:   96% 94%   Weight:       Height:                                                  BP Readings from Last 3 Encounters:   18 122/84   13 129/68   13 143/65       NPO Status:                                                                                 BMI:   Wt Readings from Last 3 Encounters:   18 140 lb (63.5 kg)   13 140 lb (63.5 kg)   01/07/13 140 lb (63.5 kg)     Body mass index is 24.03 kg/m². CBC:   Lab Results   Component Value Date    WBC 28.8 09/16/2018    RBC 5.46 09/16/2018    HGB 15.7 09/16/2018    HCT 46.5 09/16/2018    MCV 85.2 09/16/2018    RDW 13.6 09/16/2018     09/16/2018       CMP:   Lab Results   Component Value Date     09/16/2018    K 4.2 09/16/2018    CL 99 09/16/2018    CO2 27 09/16/2018    BUN 12 09/16/2018    CREATININE 0.9 09/16/2018    GFRAA >60 09/16/2018    LABGLOM >60 09/16/2018    GLUCOSE 132 09/16/2018    PROT 8.3 09/16/2018    CALCIUM 10.0 09/16/2018    BILITOT 0.8 09/16/2018    ALKPHOS 80 09/16/2018    AST 19 09/16/2018    ALT 16 09/16/2018       POC Tests: No results for input(s): POCGLU, POCNA, POCK, POCCL, POCBUN, POCHEMO, POCHCT in the last 72 hours. Coags: No results found for: PROTIME, INR, APTT    HCG (If Applicable): No results found for: PREGTESTUR, PREGSERUM, HCG, HCGQUANT     ABGs: No results found for: PHART, PO2ART, WND6OEJ, DJG4RPM, BEART, J0YNSLEO     Type & Screen (If Applicable):  No results found for: LABABO, [address]    Anesthesia Evaluation  Patient summary reviewed no history of anesthetic complications:   Airway:         Dental:          Pulmonary:   (+) current smoker          Patient smoked on day of surgery. Cardiovascular:Negative CV ROS                      Neuro/Psych:               GI/Hepatic/Renal:            ROS comment: On CT: multiple diverticula  Mild ascites. Endo/Other:                     Abdominal:           Vascular:                                        Anesthesia Plan      general     ASA 2 - emergent       Induction: intravenous and rapid sequence. MIPS: Postoperative opioids intended, Prophylactic antiemetics administered and Postoperative trial extubation. Anesthetic plan and risks discussed with patient. Plan discussed with attending.                   ANTOINE Irby - CRNA   9/16/2018      Pt seen, examined,

## 2018-09-24 ENCOUNTER — TELEPHONE (OUTPATIENT)
Dept: NEUROLOGY | Age: 33
End: 2018-09-24

## 2018-10-08 ENCOUNTER — TELEPHONE (OUTPATIENT)
Dept: SURGERY | Age: 33
End: 2018-10-08

## 2018-10-10 ENCOUNTER — TELEPHONE (OUTPATIENT)
Dept: SURGERY | Age: 33
End: 2018-10-10

## 2018-10-10 NOTE — TELEPHONE ENCOUNTER
Per  via perfect serve patient can go back to work with restrictions of no more than 20lbs of lifting until 10/28/18. Ma routing message to Nydia Monreal for further assistance in writing and contacting patient to  letter.       Electronically signed by Andie Dent MA on 10/10/18 at 1:42 PM

## 2018-10-10 NOTE — TELEPHONE ENCOUNTER
MA received a call from patient in regards to getting a work note. Patient missed his P/O appt on 10/03/18 and is rescheduled for 10/24/18. Patient is requesting to see if he can have a return to work letter. Patient states doesn't do very much lifting most is 20-25lbs at a time, barely bends or twist. Patient could also be released on light duty possibly also if cant do full duty. Patient had Lap appendectomy on 09/16/18. MA routing message to  for advisement on patient return to work status, and also Maggie Bloch for informational purposes. Patient can be reached at [phone] in regards to the work letter.         Electronically signed by Nathalie Redding MA on 10/10/18 at 10:59 AM

## 2021-10-22 VITALS
HEART RATE: 92 BPM | RESPIRATION RATE: 14 BRPM | WEIGHT: 145 LBS | BODY MASS INDEX: 24.75 KG/M2 | TEMPERATURE: 98.1 F | DIASTOLIC BLOOD PRESSURE: 80 MMHG | SYSTOLIC BLOOD PRESSURE: 128 MMHG | OXYGEN SATURATION: 98 % | HEIGHT: 64 IN

## 2021-10-22 ASSESSMENT — PAIN SCALES - GENERAL: PAINLEVEL_OUTOF10: 5

## 2021-10-22 ASSESSMENT — PAIN DESCRIPTION - ORIENTATION: ORIENTATION: LEFT

## 2021-10-22 ASSESSMENT — PAIN DESCRIPTION - LOCATION: LOCATION: FOOT

## 2021-10-22 NOTE — ED NOTES
FIRST PROVIDER CONTACT ASSESSMENT NOTE      Department of Emergency Medicine   10/22/21  7:18 PM EDT    Chief Complaint: Hand Pain (Right hand pain and numbness approx 5 days ago, denies injury), Foot Pain (Left foot pain started 5 days ago), and Fatigue (Started 5 days ago)      History of Present Illness:   Johanny De La Garza is a 29 y.o. male who presents to the ED for right hand numbness and left foot pain and numbness that started 5 days ago for both. He denies any injury or trauma. He denies any extremity weakness. He denies any headache or blurred or double vision. Denies any shortness breath or chest pain. Speech is clear in triage. Ambulates with a steady gait. Medical History:  has a past medical history of Heart enlarged. Surgical History:  has a past surgical history that includes pr lap,appendectomy (N/A, 9/16/2018). Social History:  reports that he has been smoking cigarettes. He has never used smokeless tobacco.  Family History: family history is not on file. *ALLERGIES*     Patient has no known allergies.      Physical Exam:      VS:  /80   Pulse 92   Temp 98.1 °F (36.7 °C) (Oral)   Resp 14   Ht 5' 4\" (1.626 m)   Wt 145 lb (65.8 kg)   SpO2 98%   BMI 24.89 kg/m²      Initial Plan of Care:  Initiate Treatment-Testing, Proceed toTreatment Area When Bed Available for ED Attending/MLP to Continue Care    -----------------END OF FIRST PROVIDER CONTACT ASSESSMENT NOTE--------------  Electronically signed by ANTOINE Seo CNP   DD: 10/22/21             ANTOINE Seo CNP  10/22/21 [id number]

## 2021-10-23 ENCOUNTER — HOSPITAL ENCOUNTER (EMERGENCY)
Age: 36
Discharge: LEFT BEFORE DISCHARGE | End: 2021-10-23

## 2023-07-08 NOTE — TELEPHONE ENCOUNTER
General


Chief Complaint:  Abdominal/GI Problems


Stated Complaint:  AB DISOMFORT/PAIN AROUND INCISSION/KIDNEY TRANSPLA


Nursing Triage Note:  


pt presents to ed via pov from home with complaints of r sided abdominal pain 


more in his upper quadrant x 1 week. pt had a kidney transplant 17 years ago. pt




reports he had labs drawn related to that yesterday and was told they were all 


good. pt reportss nausea but no vomiting.


Source of Information:  Patient


Exam Limitations:  No Limitations





History of Present Illness


Date Seen by Provider:  Jul 8, 2023


Time Seen by Provider:  13:30


Initial Comments


Patient is a 40-year-old male who presents to the emergency department with a 

chief complaint of right upper quadrant abdominal pain, nausea over the course 

of the last week.  He states he was in [place] and had some hunted deer meat 

game meats last week and ever since he has had a queasy stomach and discomfort 

in the upper abdomen.  He states he has been very "gassy".  A little diarrhea 

nonblack nonbloody.  He has had no actual vomiting.  He states he thinks he is 

lost about 5 pounds in the last week.  He has had subjective fever, he feels a 

little chilled.  Normal urine output.  He is status post kidney transplant 19 

years ago at  for IgA nephropathy.  He had basic metabolic panel, urinalysis 

urine electrolytes and tacrolimus level done yesterday which he brought with him

today.  These labs have been reviewed by me and are all within normal limits.  

He has scheduled follow-up with his transplant team on 30 July.


Timing/Duration:  1 Week


Severity/Quality:  Moderate


Location:  [facility]


Radiation:  No Radiation





Allergies and Home Medications


Allergies


Coded Allergies:  


     No Known Allergies (Verified  Allergy, Unknown, 9/27/06)





Past Medical-Social-Family Hx


Patient Social History


Tobacco Use?:  No


Substance use?:  Yes


Substance type:  Marijuana


Additional substance use comme:  last use 1 week ago


Substance frequency:  Once in a while


Alcohol Use?:  No


Pt feels they are or have been:  No





Past Medical History


Surgery/Hospitalization HX:  


[facility]: kidney transpant, fistula l arm, htn, hernia repair





Physical Exam


Vital Signs





Vital Signs - First Documented








 7/8/23





 13:13


 


Pulse 69


 


Resp 18


 


B/P (MAP) 159/107 (124)


 


Pulse Ox 97





Capillary Refill : Less Than 3 Seconds


Height/Weight/BMI


Height: '"


Weight: lbs. oz. kg; 22.00 BMI


Method:





Progress/Results/Core Measures


Results/Orders


Lab Results





Laboratory Tests








Test


 7/8/23


13:50 Range/Units


 


 


White Blood Count


 10.0 


 4.3-11.0


10^3/uL


 


Red Blood Count


 4.45 


 4.30-5.52


10^6/uL


 


Hemoglobin 14.2  13.3-17.7  g/dL


 


Hematocrit 40  40-54  %


 


Mean Corpuscular Volume 91  80-99  fL


 


Mean Corpuscular Hemoglobin 32  25-34  pg


 


Mean Corpuscular Hemoglobin


Concent 35 


 32-36  g/dL





 


Red Cell Distribution Width 11.9  10.0-14.5  %


 


Platelet Count


 307 


 130-400


10^3/uL


 


Mean Platelet Volume 9.6  9.0-12.2  fL


 


Immature Granulocyte % (Auto) 0   %


 


Neutrophils (%) (Auto) 65  42-75  %


 


Lymphocytes (%) (Auto) 26  12-44  %


 


Monocytes (%) (Auto) 7  0-12  %


 


Eosinophils (%) (Auto) 1  0-10  %


 


Basophils (%) (Auto) 1  0-10  %


 


Neutrophils # (Auto)


 6.5 


 1.8-7.8


10^3/uL


 


Lymphocytes # (Auto)


 2.6 


 1.0-4.0


10^3/uL


 


Monocytes # (Auto)


 0.7 


 0.0-1.0


10^3/uL


 


Eosinophils # (Auto)


 0.1 


 0.0-0.3


10^3/uL


 


Basophils # (Auto)


 0.1 


 0.0-0.1


10^3/uL


 


Immature Granulocyte # (Auto)


 0.0 


 0.0-0.1


10^3/uL


 


Sodium Level 140  135-145  MMOL/L


 


Potassium Level 4.2  3.6-5.0  MMOL/L


 


Chloride Level 108 H   MMOL/L


 


Carbon Dioxide Level 21  21-32  MMOL/L


 


Anion Gap 11  5-14  MMOL/L


 


Blood Urea Nitrogen 16  7-18  MG/DL


 


Creatinine


 1.04 


 0.60-1.30


MG/DL


 


Estimat Glomerular Filtration


Rate 94 


  





 


BUN/Creatinine Ratio 15   


 


Glucose Level 106 H   MG/DL


 


Calcium Level 10.0  8.5-10.1  MG/DL


 


Corrected Calcium 9.8  8.5-10.1  MG/DL


 


Total Bilirubin 0.4  0.1-1.0  MG/DL


 


Aspartate Amino Transf


(AST/SGOT) 11 


 5-34  U/L





 


Alanine Aminotransferase


(ALT/SGPT) 11 


 0-55  U/L





 


Alkaline Phosphatase 68    U/L


 


Total Protein 7.3  6.4-8.2  GM/DL


 


Albumin 4.3  3.2-4.5  GM/DL








My Orders





Orders - JENNIFER SLAUGHTER MD


Ed Iv/Invasive Line Start (7/8/23 13:43)


Cbc With Automated Diff (7/8/23 13:43)


Comprehensive Metabolic Panel (7/8/23 13:43)


Ns Iv 1000 Ml (Sodium Chloride 0.9%) (7/8/23 13:45)


Hydroxyzine Cap/Tab (Vistaril) (7/8/23 14:45)





Medications Given in ED





Current Medications








 Medications  Dose


 Ordered  Sig/Alison


 Route  Start Time


 Stop Time Status Last Admin


Dose Admin


 


 Hydroxyzine


 Pamoate  25 mg  ONCE  ONCE


 PO  7/8/23 14:45


 7/8/23 14:46 DC 7/8/23 14:44


25 MG








Vital Signs/I&O











 7/8/23





 13:13


 


Pulse 69


 


Resp 18


 


B/P (MAP) 159/107 (124)


 


Pulse Ox 97














Blood Pressure Mean:                    124











Departure


Impression





   Primary Impression:  


   Abdominal pain


   Qualified Codes:  R10.11 - Right upper quadrant pain


   Additional Impression:  


   S/P kidney transplant


Disposition:  01 HOME, SELF-CARE


Condition:  Stable





Departure-Patient Inst.


Decision time for Depature:  15:10


Referrals:  


NO,LOCAL PHYSICIAN (PCP/Family)


Primary Care Physician


Patient Instructions:  Abdominal Pain, Adult ED





Add. Discharge Instructions:  


Continue your daily medications as prescribed.





Follow up with [facility] for your abdominal discomfort.





If you have any fever over 100.4 or vomiting please return to the Emergency 

Department for re-evaluation.





You should start over the counter generic Prilosec (Omeprazole) one tablet at 

night daily.





Ondansetron 4mg orally dissolving tablets every 8 hours for queasy 

stomach/nausea.


Scripts


Ondansetron (Ondansetron Odt) 4 Mg Tab.rapdis


4 MG SL Q8H PRN for NAUSEA/VOMITING, #12 TAB


   Prov: JENNIFER SLAUGHTER MD         7/8/23


Work/School Note:  Work Release Form   Date Seen in the Emergency Department:  

Jul 8, 2023


   Return to Work:  Jul 10, 2023


      Other Restrictions Listed Below:  Off work 7/7, 7/8 and 7/9 due to illness











JENNIFER SLAUGHTER MD          Jul 8, 2023 13:31 ROSEMARIE Faust typed up letter and contacted patient to inform him he can  letter today until 4:30pm or MA can fax the letter to patients work. Patient did not answer so MA left message informing patient of this and asking patient to call office back at [phone].     Electronically signed by Parish Hurst on 10/10/18 at 2:05 PM

## (undated) DEVICE — TROCAR ENDOSCP L100MM DIA12MM BLDELSS OBT RADLUC STBL SL

## (undated) DEVICE — INTENDED FOR TISSUE SEPARATION, AND OTHER PROCEDURES THAT REQUIRE A SHARP SURGICAL BLADE TO PUNCTURE OR CUT.: Brand: BARD-PARKER ® STAINLESS STEEL BLADES

## (undated) DEVICE — SET ENDO INSTR LAPAROSCOPIC STGENLAP

## (undated) DEVICE — SOLUTION IV IRRIG WATER 1000ML POUR BRL 2F7114

## (undated) DEVICE — GARMENT COMPR STD FOR 17IN CALF UNIF THER FLOTRN

## (undated) DEVICE — CUTTER ENDOSCP L340MM LIN ARTC SGL STROKE FIRING ENDOPATH

## (undated) DEVICE — Z DISCONTINUED NO SUB IDED BAG SPEC RETRV M C240ML MOUTH 7.3MM L17CM SHFT 10MM NYL EZEE

## (undated) DEVICE — SYRINGE MED 20ML STD CLR PLAS LUERLOCK TIP N CTRL DISP

## (undated) DEVICE — 1.5L THIN WALL CAN: Brand: CRD

## (undated) DEVICE — NEEDLE INSUF L120MM DIA2MM DISP FOR PNEUMOPERI ENDOPATH

## (undated) DEVICE — SET INSTRUMENT LAP I

## (undated) DEVICE — CHLORAPREP 26ML ORANGE

## (undated) DEVICE — PMI DISPOSABLE PUNCTURE CLOSURE DEVICE / SUTURE GRASPER: Brand: PMI

## (undated) DEVICE — TOTAL TRAY, DB, 100% SILI FOLEY, 16FR 10: Brand: MEDLINE

## (undated) DEVICE — RELOAD STPL SZ 0 L45MM DIA3.5MM 0DEG STD REG TISS BLU TI

## (undated) DEVICE — TROCAR ENDOSCP L100MM DIA5MM BLDELSS STBL SL OBT RADLUC

## (undated) DEVICE — INSUFFLATION TUBING SET WITH FILTER, FUNNEL CONNECTOR AND LUER LOCK: Brand: JOSNOE MEDICAL INC

## (undated) DEVICE — SURGICAL PROCEDURE PACK BASIC

## (undated) DEVICE — STANDARD HYPODERMIC NEEDLE,POLYPROPYLENE HUB: Brand: MONOJECT

## (undated) DEVICE — KIT,ANTI FOG,W/SPONGE & FLUID,SOFT PACK: Brand: MEDLINE

## (undated) DEVICE — PATIENT RETURN ELECTRODE, SINGLE-USE, CONTACT QUALITY MONITORING, ADULT, WITH 9FT CORD, FOR PATIENTS WEIGING OVER 33LBS. (15KG): Brand: MEGADYNE

## (undated) DEVICE — TOWEL,OR,DSP,ST,BLUE,STD,6/PK,12PK/CS: Brand: MEDLINE

## (undated) DEVICE — BLADE CLIPPER GEN PURP NS

## (undated) DEVICE — STERILE LATEX POWDER FREE SURGICAL GLOVES WITH HYDROGEL COATING: Brand: PROTEXIS

## (undated) DEVICE — TIBURON GENERAL ENDOSCOPY DRAPE: Brand: CONVERTORS

## (undated) DEVICE — LAPAROSCOPIC SCISSORS: Brand: EPIX LAPAROSCOPIC SCISSORS

## (undated) DEVICE — CAMERA STRYKER 1488 HD GEN

## (undated) DEVICE — SOLUTION IV 1000ML 0.9% SOD CHL FOR IRRIG PLAS CONT

## (undated) DEVICE — SKIN AFFIX SURG ADHESIVE 72/CS 0.55ML: Brand: MEDLINE

## (undated) DEVICE — RELOAD STPL H1-2.5X45MM VASC THN TISS WHT 6 ROW B FRM SGL

## (undated) DEVICE — GLOVE ORANGE PI 7   MSG9070

## (undated) DEVICE — PLUMEPORT LAPAROSCOPIC SMOKE FILTRATION DEVICE: Brand: PLUMEPORT ACTIV